# Patient Record
Sex: MALE | Race: WHITE | Employment: OTHER | URBAN - METROPOLITAN AREA
[De-identification: names, ages, dates, MRNs, and addresses within clinical notes are randomized per-mention and may not be internally consistent; named-entity substitution may affect disease eponyms.]

---

## 2020-10-05 ENCOUNTER — HOSPITAL ENCOUNTER (EMERGENCY)
Age: 40
Discharge: HOME | End: 2020-10-05
Attending: EMERGENCY MEDICINE

## 2020-10-05 VITALS
OXYGEN SATURATION: 100 % | SYSTOLIC BLOOD PRESSURE: 141 MMHG | RESPIRATION RATE: 18 BRPM | WEIGHT: 220 LBS | HEIGHT: 72 IN | TEMPERATURE: 98.2 F | DIASTOLIC BLOOD PRESSURE: 79 MMHG | HEART RATE: 82 BPM | BODY MASS INDEX: 29.8 KG/M2

## 2020-10-05 PROCEDURE — 99283 EMERGENCY DEPT VISIT LOW MDM: CPT

## 2020-10-05 PROCEDURE — 6370000000 HC RX 637 (ALT 250 FOR IP): Performed by: EMERGENCY MEDICINE

## 2020-10-05 RX ORDER — OXYCODONE HYDROCHLORIDE AND ACETAMINOPHEN 5; 325 MG/1; MG/1
1 TABLET ORAL EVERY 4 HOURS PRN
Qty: 10 TABLET | Refills: 0 | Status: SHIPPED | OUTPATIENT
Start: 2020-10-05 | End: 2020-10-08

## 2020-10-05 RX ORDER — NEOMYCIN SULFATE, POLYMYXIN B SULFATE, AND DEXAMETHASONE 3.5; 10000; 1 MG/G; [USP'U]/G; MG/G
OINTMENT OPHTHALMIC ONCE
Status: COMPLETED | OUTPATIENT
Start: 2020-10-05 | End: 2020-10-05

## 2020-10-05 RX ORDER — OXYCODONE HYDROCHLORIDE AND ACETAMINOPHEN 5; 325 MG/1; MG/1
2 TABLET ORAL ONCE
Status: COMPLETED | OUTPATIENT
Start: 2020-10-05 | End: 2020-10-05

## 2020-10-05 RX ORDER — ONDANSETRON 4 MG/1
4 TABLET, ORALLY DISINTEGRATING ORAL ONCE
Status: COMPLETED | OUTPATIENT
Start: 2020-10-05 | End: 2020-10-05

## 2020-10-05 RX ADMIN — ONDANSETRON 4 MG: 4 TABLET, ORALLY DISINTEGRATING ORAL at 01:25

## 2020-10-05 RX ADMIN — OXYCODONE HYDROCHLORIDE AND ACETAMINOPHEN 2 TABLET: 5; 325 TABLET ORAL at 01:25

## 2020-10-05 RX ADMIN — NEOMYCIN AND POLYMYXIN B SULFATES AND DEXAMETHASONE: 3.5; 10000; 1 OINTMENT OPHTHALMIC at 01:25

## 2020-10-05 ASSESSMENT — PAIN SCALES - GENERAL
PAINLEVEL_OUTOF10: 8
PAINLEVEL_OUTOF10: 8

## 2020-10-05 NOTE — ED PROVIDER NOTES
[facility] ED  eMERGENCY dEPARTMENT eNCOUnter      Pt Name: Bobby Cruz  MRN: [id number]  [place] 1980  Date of evaluation: 10/5/2020  Provider: Ilene Paz MD    CHIEF COMPLAINT       Chief Complaint   Patient presents with    Eye Pain         HISTORY OF PRESENT ILLNESS  (Location/Symptom, Timing/Onset, Context/Setting, Quality, Duration, Modifying Factors, Severity.)   Bobby Cruz is a 44 y.o. male who presents to the emergency department for evaluation of possible foreign body in his left eye. Patient states he was driving home with the windows open. He states he felt something go into his eye. It felt like it was under his upper eyelid. Through the course of the evening he is tried many ways to remove the foreign body. He has flushed the eye he has used a water sprayer in his eye he has used Q-tips. He has used multiple eyedrops. He continues to have foreign body sensation. He does not wear contacts. Immunization status is up-to-date. Nursing Notes were reviewed. ALLERGIES     Patient has no known allergies. CURRENT MEDICATIONS       Previous Medications    No medications on file       PAST MEDICAL HISTORY   History reviewed. No pertinent past medical history. SURGICAL HISTORY           Procedure Laterality Date    APPENDECTOMY      HAND SURGERY Right          FAMILY HISTORY     History reviewed. No pertinent family history. No family status information on file. SOCIAL HISTORY      reports that he has been smoking. He does not have any smokeless tobacco history on file. He reports that he does not drink alcohol or use drugs. REVIEW OF SYSTEMS    (2-9 systems for level 4, 10 or more for level 5)     Review of Systems   All other systems reviewed and are negative. Except as noted above the remainder of the review of systems was reviewed and negative.      PHYSICAL EXAM    (up to 7 for level 4, 8 or more for level 5)     Vitals: 10/05/20 0032   BP: (!) 141/79   Pulse: 82   Temp: 98.2 °F (36.8 °C)   TempSrc: Oral   SpO2: 100%   Weight: 220 lb (99.8 kg)   Height: 6' (1.829 m)       Physical exam reflects an uncomfortable male. He is afebrile with stable vital signs to include pulse ox of 100% on room air. He is not hypoxic. He is alert conversive and appropriate in behavior. Left eye shows scleral and conjunctival injection. Tetracaine applied with good effect. Corneal margins are distinct. Globes are soft and symmetric. Eyelids everted for exam no evidence of foreign body to the upper or lower eyelid. I examined no gross evidence of foreign body. Tetracaine applied. No evidence of corneal abrasion or foreign body. Juan Carlos test negative. No periorbital inflammation. No eyelid abnormality. Right eye is without symptoms. Remainder of exam to include chest abdomen extremities without evidence of or complaint of injury or illness      DIAGNOSTIC RESULTS     EKG: All EKG's are interpreted by the Emergency Department Physician who either signs or Co-signs this chart in the absence of a cardiologist.    RADIOLOGY:   Non-plain film images such as CT, Ultrasound and MRI are read by the radiologist. Plain radiographic images are visualized and preliminarily interpreted by the emergency physician with the below findings:  Interpretation per the Radiologist below, if available at the time of this note:    No orders to display         ED BEDSIDE ULTRASOUND:   Performed by ED Physician - none    LABS:  Labs Reviewed - No data to display    All other labs were within normal range or not returned as of this dictation. EMERGENCY DEPARTMENT COURSE and DIFFERENTIAL DIAGNOSIS/MDM:   Vitals:    Vitals:    10/05/20 0032   BP: (!) 141/79   Pulse: 82   Temp: 98.2 °F (36.8 °C)   TempSrc: Oral   SpO2: 100%   Weight: 220 lb (99.8 kg)   Height: 6' (1.829 m)     Patient's eye is examined. No evidence of discrete foreign body noted.   Patient does display scleritis. He does have foreign body sensation. I cannot exclude that initially had foreign body which through his efforts he succeeded in removing but now has the scleritis and inflammation remaining to cause his discomfort. He is treated for pain. He is placed on Maxitrol ophthalmic ointment. He will be discharged home. He is advised reevaluation with ophthalmology should his symptoms persist    CONSULTS:  None    PROCEDURES:  None    FINAL IMPRESSION      1. Scleritis of left eye    2. Sensation of foreign body in eye          DISPOSITION/PLAN   DISPOSITION Decision To Discharge 10/05/2020 01:03:39 AM      PATIENT REFERRED TO:   Julio Cesar Lundy  [address]  [phone]    Call today  If symptoms worsen - Ophthalmology re-evaluation      DISCHARGE MEDICATIONS:     New Prescriptions    NEOMYCIN-POLYMYXIN-DEXAMETH 0.1 % OINT    Apply 0.5 inches to eye every 4 hours    OXYCODONE-ACETAMINOPHEN (PERCOCET) 5-325 MG PER TABLET    Take 1 tablet by mouth every 4 hours as needed for Pain for up to 3 days. Controlled Substance Monitoring:    Acute and Chronic Pain Monitoring:   RX Monitoring 10/5/2020   Periodic Controlled Substance Monitoring No signs of potential drug abuse or diversion identified.          (Please note that portions of this note were completed with a voice recognition program.  Efforts were made to edit the dictations but occasionally words are mis-transcribed.)    Gino Lennox, MD  Attending Emergency Physician          Gino Lennox, MD  10/05/20 0110

## 2022-01-09 ENCOUNTER — APPOINTMENT (OUTPATIENT)
Dept: GENERAL RADIOLOGY | Age: 42
End: 2022-01-09

## 2022-01-09 ENCOUNTER — HOSPITAL ENCOUNTER (EMERGENCY)
Age: 42
Discharge: HOME | End: 2022-01-09
Attending: EMERGENCY MEDICINE

## 2022-01-09 VITALS
HEIGHT: 72 IN | TEMPERATURE: 98.8 F | SYSTOLIC BLOOD PRESSURE: 139 MMHG | DIASTOLIC BLOOD PRESSURE: 107 MMHG | RESPIRATION RATE: 16 BRPM | WEIGHT: 220 LBS | BODY MASS INDEX: 29.8 KG/M2 | OXYGEN SATURATION: 96 % | HEART RATE: 108 BPM

## 2022-01-09 DIAGNOSIS — S61.402A OPEN WOUND OF LEFT HAND, FOREIGN BODY PRESENCE UNSPECIFIED, UNSPECIFIED WOUND TYPE, INITIAL ENCOUNTER: ICD-10-CM

## 2022-01-09 DIAGNOSIS — S41.102A OPEN ARM WOUND, LEFT, INITIAL ENCOUNTER: ICD-10-CM

## 2022-01-09 DIAGNOSIS — F19.10 IV DRUG ABUSE (HCC): Primary | ICD-10-CM

## 2022-01-09 LAB
ABSOLUTE EOS #: 0.12 K/UL (ref 0–0.44)
ABSOLUTE IMMATURE GRANULOCYTE: 0.05 K/UL (ref 0–0.3)
ABSOLUTE LYMPH #: 1 K/UL (ref 1.1–3.7)
ABSOLUTE MONO #: 0.52 K/UL (ref 0.1–1.2)
ALBUMIN SERPL-MCNC: 4.2 G/DL (ref 3.5–5.2)
ALBUMIN/GLOBULIN RATIO: ABNORMAL (ref 1–2.5)
ALP BLD-CCNC: 100 U/L (ref 40–129)
ALT SERPL-CCNC: 16 U/L (ref 5–41)
ANION GAP SERPL CALCULATED.3IONS-SCNC: 16 MMOL/L (ref 9–17)
AST SERPL-CCNC: 23 U/L
BASOPHILS # BLD: 0 % (ref 0–2)
BASOPHILS ABSOLUTE: 0.05 K/UL (ref 0–0.2)
BILIRUB SERPL-MCNC: 0.52 MG/DL (ref 0.3–1.2)
BUN BLDV-MCNC: 14 MG/DL (ref 6–20)
BUN/CREAT BLD: 15 (ref 9–20)
C-REACTIVE PROTEIN: 36.7 MG/L (ref 0–5)
CALCIUM SERPL-MCNC: 9.6 MG/DL (ref 8.6–10.4)
CHLORIDE BLD-SCNC: 102 MMOL/L (ref 98–107)
CO2: 23 MMOL/L (ref 20–31)
CREAT SERPL-MCNC: 0.96 MG/DL (ref 0.7–1.2)
DIFFERENTIAL TYPE: ABNORMAL
EOSINOPHILS RELATIVE PERCENT: 1 % (ref 1–4)
GFR AFRICAN AMERICAN: >60 ML/MIN
GFR NON-AFRICAN AMERICAN: >60 ML/MIN
GFR SERPL CREATININE-BSD FRML MDRD: ABNORMAL ML/MIN/{1.73_M2}
GFR SERPL CREATININE-BSD FRML MDRD: ABNORMAL ML/MIN/{1.73_M2}
GLUCOSE BLD-MCNC: 142 MG/DL (ref 70–99)
HCT VFR BLD CALC: 41.9 % (ref 40.7–50.3)
HEMOGLOBIN: 12.5 G/DL (ref 13–17)
IMMATURE GRANULOCYTES: 0 %
INR BLD: 1
LACTIC ACID: 2.5 MMOL/L (ref 0.5–2.2)
LYMPHOCYTES # BLD: 8 % (ref 24–43)
MCH RBC QN AUTO: 22.5 PG (ref 25.2–33.5)
MCHC RBC AUTO-ENTMCNC: 29.8 G/DL (ref 28.4–34.8)
MCV RBC AUTO: 75.4 FL (ref 82.6–102.9)
MONOCYTES # BLD: 4 % (ref 3–12)
NRBC AUTOMATED: 0 PER 100 WBC
PARTIAL THROMBOPLASTIN TIME: 30.5 SEC (ref 23.9–33.8)
PDW BLD-RTO: 15.9 % (ref 11.8–14.4)
PLATELET # BLD: 467 K/UL (ref 138–453)
PLATELET ESTIMATE: ABNORMAL
PMV BLD AUTO: 9 FL (ref 8.1–13.5)
POTASSIUM SERPL-SCNC: 4 MMOL/L (ref 3.7–5.3)
PROCALCITONIN: 0.12 NG/ML
PROTHROMBIN TIME: 13 SEC (ref 11.5–14.2)
RBC # BLD: 5.56 M/UL (ref 4.21–5.77)
RBC # BLD: ABNORMAL 10*6/UL
SEDIMENTATION RATE, ERYTHROCYTE: 32 MM (ref 0–15)
SEG NEUTROPHILS: 87 % (ref 36–65)
SEGMENTED NEUTROPHILS ABSOLUTE COUNT: 10.51 K/UL (ref 1.5–8.1)
SODIUM BLD-SCNC: 141 MMOL/L (ref 135–144)
TOTAL PROTEIN: 8.7 G/DL (ref 6.4–8.3)
WBC # BLD: 12.3 K/UL (ref 3.5–11.3)
WBC # BLD: ABNORMAL 10*3/UL

## 2022-01-09 PROCEDURE — 86140 C-REACTIVE PROTEIN: CPT

## 2022-01-09 PROCEDURE — 96375 TX/PRO/DX INJ NEW DRUG ADDON: CPT

## 2022-01-09 PROCEDURE — 85025 COMPLETE CBC W/AUTO DIFF WBC: CPT

## 2022-01-09 PROCEDURE — 96376 TX/PRO/DX INJ SAME DRUG ADON: CPT

## 2022-01-09 PROCEDURE — 96365 THER/PROPH/DIAG IV INF INIT: CPT

## 2022-01-09 PROCEDURE — 84145 PROCALCITONIN (PCT): CPT

## 2022-01-09 PROCEDURE — 83605 ASSAY OF LACTIC ACID: CPT

## 2022-01-09 PROCEDURE — 85652 RBC SED RATE AUTOMATED: CPT

## 2022-01-09 PROCEDURE — 99282 EMERGENCY DEPT VISIT SF MDM: CPT

## 2022-01-09 PROCEDURE — 36415 COLL VENOUS BLD VENIPUNCTURE: CPT

## 2022-01-09 PROCEDURE — 87040 BLOOD CULTURE FOR BACTERIA: CPT

## 2022-01-09 PROCEDURE — 85730 THROMBOPLASTIN TIME PARTIAL: CPT

## 2022-01-09 PROCEDURE — 96372 THER/PROPH/DIAG INJ SC/IM: CPT

## 2022-01-09 PROCEDURE — 73110 X-RAY EXAM OF WRIST: CPT

## 2022-01-09 PROCEDURE — 2580000003 HC RX 258: Performed by: EMERGENCY MEDICINE

## 2022-01-09 PROCEDURE — 85610 PROTHROMBIN TIME: CPT

## 2022-01-09 PROCEDURE — 6360000002 HC RX W HCPCS: Performed by: EMERGENCY MEDICINE

## 2022-01-09 PROCEDURE — 73090 X-RAY EXAM OF FOREARM: CPT

## 2022-01-09 PROCEDURE — 80053 COMPREHEN METABOLIC PANEL: CPT

## 2022-01-09 PROCEDURE — 73130 X-RAY EXAM OF HAND: CPT

## 2022-01-09 PROCEDURE — 2500000003 HC RX 250 WO HCPCS: Performed by: EMERGENCY MEDICINE

## 2022-01-09 RX ORDER — 0.9 % SODIUM CHLORIDE 0.9 %
1000 INTRAVENOUS SOLUTION INTRAVENOUS ONCE
Status: COMPLETED | OUTPATIENT
Start: 2022-01-09 | End: 2022-01-09

## 2022-01-09 RX ORDER — HYDROMORPHONE HYDROCHLORIDE 1 MG/ML
1 INJECTION, SOLUTION INTRAMUSCULAR; INTRAVENOUS; SUBCUTANEOUS ONCE
Status: COMPLETED | OUTPATIENT
Start: 2022-01-09 | End: 2022-01-09

## 2022-01-09 RX ORDER — PROMETHAZINE HYDROCHLORIDE 25 MG/ML
25 INJECTION, SOLUTION INTRAMUSCULAR; INTRAVENOUS ONCE
Status: DISCONTINUED | OUTPATIENT
Start: 2022-01-09 | End: 2022-01-09 | Stop reason: HOSPADM

## 2022-01-09 RX ORDER — HYDROMORPHONE HYDROCHLORIDE 1 MG/ML
1 INJECTION, SOLUTION INTRAMUSCULAR; INTRAVENOUS; SUBCUTANEOUS ONCE
Status: DISCONTINUED | OUTPATIENT
Start: 2022-01-09 | End: 2022-01-09 | Stop reason: HOSPADM

## 2022-01-09 RX ORDER — CEFTRIAXONE 1 G/1
1000 INJECTION, POWDER, FOR SOLUTION INTRAMUSCULAR; INTRAVENOUS ONCE
Status: COMPLETED | OUTPATIENT
Start: 2022-01-09 | End: 2022-01-09

## 2022-01-09 RX ORDER — HYDROXYZINE HYDROCHLORIDE 25 MG/ML
25 INJECTION, SOLUTION INTRAMUSCULAR ONCE
Status: DISCONTINUED | OUTPATIENT
Start: 2022-01-09 | End: 2022-01-09 | Stop reason: HOSPADM

## 2022-01-09 RX ORDER — HYDROXYZINE HYDROCHLORIDE 25 MG/1
50 TABLET, FILM COATED ORAL ONCE
Status: DISCONTINUED | OUTPATIENT
Start: 2022-01-09 | End: 2022-01-09

## 2022-01-09 RX ORDER — MORPHINE SULFATE 4 MG/ML
4 INJECTION, SOLUTION INTRAMUSCULAR; INTRAVENOUS ONCE
Status: COMPLETED | OUTPATIENT
Start: 2022-01-09 | End: 2022-01-09

## 2022-01-09 RX ORDER — ONDANSETRON 2 MG/ML
4 INJECTION INTRAMUSCULAR; INTRAVENOUS ONCE
Status: COMPLETED | OUTPATIENT
Start: 2022-01-09 | End: 2022-01-09

## 2022-01-09 RX ADMIN — HYDROMORPHONE HYDROCHLORIDE 1 MG: 1 INJECTION, SOLUTION INTRAMUSCULAR; INTRAVENOUS; SUBCUTANEOUS at 10:05

## 2022-01-09 RX ADMIN — VANCOMYCIN HYDROCHLORIDE 2500 MG: 5 INJECTION, POWDER, LYOPHILIZED, FOR SOLUTION INTRAVENOUS at 10:07

## 2022-01-09 RX ADMIN — MORPHINE SULFATE 4 MG: 4 INJECTION, SOLUTION INTRAMUSCULAR; INTRAVENOUS at 09:23

## 2022-01-09 RX ADMIN — CEFTRIAXONE SODIUM 1000 MG: 1 INJECTION, POWDER, FOR SOLUTION INTRAMUSCULAR; INTRAVENOUS at 09:29

## 2022-01-09 RX ADMIN — ONDANSETRON 4 MG: 2 INJECTION INTRAMUSCULAR; INTRAVENOUS at 09:23

## 2022-01-09 RX ADMIN — FAMOTIDINE 20 MG: 10 INJECTION, SOLUTION INTRAVENOUS at 09:23

## 2022-01-09 RX ADMIN — SODIUM CHLORIDE 1000 ML: 9 INJECTION, SOLUTION INTRAVENOUS at 09:22

## 2022-01-09 RX ADMIN — MORPHINE SULFATE 4 MG: 4 INJECTION, SOLUTION INTRAMUSCULAR; INTRAVENOUS at 09:35

## 2022-01-09 ASSESSMENT — ENCOUNTER SYMPTOMS
ABDOMINAL PAIN: 0
NAUSEA: 1
COLOR CHANGE: 1
VOMITING: 1
DIARRHEA: 0

## 2022-01-09 ASSESSMENT — PAIN SCALES - GENERAL
PAINLEVEL_OUTOF10: 10

## 2022-01-09 NOTE — ED PROVIDER NOTES
[facility]  Emergency Department Encounter     Pt Name: Nora Du  MRN: [id number]  [place] 1980  Date of evaluation: 1/9/22  PCP:  No primary care provider on file. CHIEF COMPLAINT       Chief Complaint   Patient presents with    Wound Infection     L arm    Emesis       HISTORY OF PRESENT ILLNESS  (Location/Symptom, Timing/Onset, Context/Setting, Quality, Duration, Modifying Factors, Severity.)    Nora Du is a 39 y.o. male who presents with active IV drug abuse last used yesterday evening who presents emergency department with a couple months of wound to left hand and left forearm that has been progressively worsening. He states in the past 24 hours the pain has become unbearable. He states that he typically uses heroin but he gets whenever he can on the street and does inject. Last use was yesterday. He has not been running any fevers that he is aware of. However in the past 24 hours he has had multiple episodes of nausea with nonbilious nonbloody vomiting as well as body aches. Has never been evaluated for this as he is afraid of hospitals. He has no chronic medical additions that he is aware of. Not on any daily medications. Has no known medication allergies. PAST MEDICAL / SURGICAL / SOCIAL / FAMILY HISTORY    has no past medical history on file. has a past surgical history that includes Appendectomy and Hand surgery (Right).     Social History     Socioeconomic History    Marital status: Single     Spouse name: Not on file    Number of children: Not on file    Years of education: Not on file    Highest education level: Not on file   Occupational History    Not on file   Tobacco Use    Smoking status: Current Every Day Smoker    Smokeless tobacco: Never Used   Substance and Sexual Activity    Alcohol use: No    Drug use: Yes     Types: IV     Comment: Heroin    Sexual activity: Not on file   Other Topics Concern  Not on file   Social History Narrative    Not on file     Social Determinants of Health     Financial Resource Strain:     Difficulty of Paying Living Expenses: Not on file   Food Insecurity:     Worried About Running Out of Food in the Last Year: Not on file    Nancy of Food in the Last Year: Not on file   Transportation Needs:     Lack of Transportation (Medical): Not on file    Lack of Transportation (Non-Medical): Not on file   Physical Activity:     Days of Exercise per Week: Not on file    Minutes of Exercise per Session: Not on file   Stress:     Feeling of Stress : Not on file   Social Connections:     Frequency of Communication with Friends and Family: Not on file    Frequency of Social Gatherings with Friends and Family: Not on file    Attends Jewish Services: Not on file    Active Member of [address] [place] [address] [employer] or Organizations: Not on file    Attends Club or Organization Meetings: Not on file    Marital Status: Not on file   Intimate Partner Violence:     Fear of Current or Ex-Partner: Not on file    Emotionally Abused: Not on file    Physically Abused: Not on file    Sexually Abused: Not on file   Housing Stability:     Unable to Pay for Housing in the Last Year: Not on file    Number of [place] in the Last Year: Not on file    Unstable Housing in the Last Year: Not on file       History reviewed. No pertinent family history. Allergies:    Patient has no known allergies. Home Medications:  Prior to Admission medications    Medication Sig Start Date End Date Taking? Authorizing Provider   Neomycin-Polymyxin-Dexameth 0.1 % OINT Apply 0.5 inches to eye every 4 hours 10/5/20   Bill Plata MD       REVIEW OF SYSTEMS    (2-9 systems for level 4, 10 or more for level 5)    Review of Systems   Constitutional: Negative for chills, diaphoresis and fever. Gastrointestinal: Positive for nausea and vomiting. Negative for abdominal pain and diarrhea.    Musculoskeletal: Positive for myalgias. Skin: Positive for color change and wound. Allergic/Immunologic: Negative for immunocompromised state. Neurological: Negative for dizziness, weakness, light-headedness and numbness. PHYSICAL EXAM   (up to 7 for level 4, 8 or more for level 5)    VITALS:   Vitals:    01/09/22 0828   BP: (!) 139/107   Pulse: 108   Resp: 16   Temp: 98.8 °F (37.1 °C)   TempSrc: Oral   SpO2: 96%   Weight: 220 lb (99.8 kg)   Height: 6' (1.829 m)       Physical Exam  Vitals and nursing note reviewed. Constitutional:       General: He is not in acute distress. Appearance: He is well-developed. He is diaphoretic. HENT:      Head: Normocephalic and atraumatic. Eyes:      Conjunctiva/sclera: Conjunctivae normal.   Cardiovascular:      Rate and Rhythm: Normal rate and regular rhythm. Pulses: Normal pulses. Heart sounds: Normal heart sounds. Pulmonary:      Effort: Pulmonary effort is normal. No respiratory distress. Breath sounds: Normal breath sounds. No wheezing or rales. Abdominal:      General: There is no distension. Palpations: Abdomen is soft. Tenderness: There is no abdominal tenderness. Musculoskeletal:         General: Swelling and tenderness present. Normal range of motion. Cervical back: Normal range of motion. Skin:     General: Skin is warm. Capillary Refill: Capillary refill takes 2 to 3 seconds. Findings: Erythema, lesion and wound present. No bruising or ecchymosis. Comments: 2 large ulcerations. The first ulceration is to the dorsal aspect of the left hand that is down to the muscle layer with surrounding induration and erythema. The second area of ulceration is quite extensive extending most of the dorsal aspect of the left forearm down to the muscle belly layer with areas of necrosis surrounding erythema and induration. There is no active bleeding or active drainage.   There is also a area on the right hand on the dorsal aspect of ulceration, however this is scabbed over. There is some swelling, however no erythema. No active drainage or bleeding from the site. Neurological:      General: No focal deficit present. Mental Status: He is alert.    Psychiatric:         Behavior: Behavior normal.         DIFFERENTIAL  DIAGNOSIS   PLAN (LABS / IMAGING / EKG):  Orders Placed This Encounter   Procedures    Culture, Blood 1    XR HAND LEFT (MIN 3 VIEWS)    XR WRIST LEFT (MIN 3 VIEWS)    XR RADIUS ULNA LEFT (2 VIEWS)    CBC with DIFF    Comprehensive Metabolic Panel w/ Reflex to MG    APTT    PROTIME-INR    Lactic Acid    Procalcitonin    Sedimentation Rate    C-Reactive Protein    Lactic Acid    Pharmacy to Dose: Vancomycin    Insert peripheral IV       MEDICATIONS ORDERED:  Orders Placed This Encounter   Medications    cefTRIAXone (ROCEPHIN) injection 1,000 mg     Order Specific Question:   Antimicrobial Indications     Answer:   Skin and Soft Tissue Infection    0.9 % sodium chloride bolus    morphine sulfate (PF) injection 4 mg    ondansetron (ZOFRAN) injection 4 mg    famotidine (PEPCID) injection 20 mg    vancomycin (VANCOCIN) 2,500 mg in dextrose 5 % 500 mL IVPB     Order Specific Question:   Antimicrobial Indications     Answer:   Skin and Soft Tissue Infection    morphine sulfate (PF) injection 4 mg    HYDROmorphone HCl PF (DILAUDID) injection 1 mg    promethazine (PHENERGAN) injection 25 mg    DISCONTD: hydrOXYzine (ATARAX) tablet 50 mg    hydrOXYzine (VISTARIL) injection 25 mg    HYDROmorphone HCl PF (DILAUDID) injection 1 mg     DIAGNOSTIC RESULTS / EMERGENCYDEPARTMENT COURSE / MDM   LABS:  Labs Reviewed   CBC WITH AUTO DIFFERENTIAL - Abnormal; Notable for the following components:       Result Value    WBC 12.3 (*)     Hemoglobin 12.5 (*)     MCV 75.4 (*)     MCH 22.5 (*)     RDW 15.9 (*)     Platelets 705 (*)     Seg Neutrophils 87 (*)     Lymphocytes 8 (*)     Segs Absolute 10.51 (*)     Absolute Lymph potentially in the wrist as well. Diffuse soft tissue swelling with soft tissue defects. No acute osseous abnormality. XR HAND LEFT (MIN 3 VIEWS)    Result Date: 1/9/2022  EXAMINATION: THREE XRAY VIEWS OF THE LEFT HAND 1/9/2022 9:09 am COMPARISON: Left hand radiographs performed 11/04/2007. HISTORY: ORDERING SYSTEM PROVIDED HISTORY: IVDA wound swelling TECHNOLOGIST PROVIDED HISTORY: IVDA wound swelling Reason for Exam: wound FINDINGS: There is no acute osseous abnormality. The joint spaces are maintained. There is diffuse soft tissue swelling. There is soft tissue defect on the dorsal aspect of the hand and potentially the distal forearm. Diffuse soft tissue swelling with soft tissue defects. No acute osseous abnormality.        EMERGENCY DEPARTMENT COURSE:  ED Course as of 01/09/22 1059   Sun Jan 09, 2022   0957 Sed Rate(!): 32 [AO]   0957 CBC with DIFF(!):    WBC 12.3(!)   RBC 5.56   Hemoglobin Quant 12.5(!)   Hematocrit 41.9   MCV 75.4(!)   MCH 22.5(!)   MCHC 29.8   RDW 15.9(!)   Platelet Count 751(!)   MPV 9.0   NRBC Automated 0.0   Differential Type NOT REPORTED   Seg Neutrophils 87(!)   Lymphocytes 8(!)   Monocytes 4   Eosinophils % 1   Basophils 0   Immature Granulocytes 0   Segs Absolute 10.51(!)   Absolute Lymph # 1.00(!)   Absolute Mono # 0.52   Absolute Eos # 0.12   Basophils Absolute 0.05   Absolute Immature Granulocyte 0.05   WBC Morphology NOT REPORTED   RBC Morphology ANISOCYTOSIS PRESENT   Platelet Estimate NOT REPORTED [AO]   0957 INR: 1.0 [AO]   0957 PTT: 30.5 [AO]   0957 Lactic Acid(!): 2.5 [AO]   1012 Comprehensive Metabolic Panel w/ Reflex to MG(!):    Glucose 142(!)   BUN 14   Creatinine 0.96   Bun/Cre Ratio 15   CALCIUM, SERUM, 736599 9.6   Sodium 141   Potassium 4.0   Chloride 102   CO2 23   Anion Gap 16   Alk Phos 100   ALT 16   AST 23   Bilirubin 0.52   Total Protein 8.7(!)   Albumin 4.2   Albumin/Globulin Ratio NOT REPORTED   GFR Non- >60   GFR  >60   GFR Comment        GFR Staging NOT REPORTED [AO]   1012 XR WRIST LEFT (MIN 3 VIEWS) [AO]   1012 XR HAND LEFT (MIN 3 VIEWS) [AO]   1020 XR RADIUS ULNA LEFT (2 VIEWS) [AO]   9129 Notified by nursing that patient is no longer in room. Checked all the bathrooms, they are empty. Patient's mask and his gown around the bed. Will notify security that he potentially eloped with IV in place. [AO]      ED Course User Index  [AO] Mich Lopez 1721, DO       MDM  Number of Diagnoses or Management Options  IV drug abuse ([facility]  Open arm wound, left, initial encounter  Open wound of left hand, foreign body presence unspecified, unspecified wound type, initial encounter  Diagnosis management comments: Patient is a 66-year-old male who is a admitted IV drug abuser presents emergency department with pain in wound to the left arm as well as nausea and vomiting for the past 24 hours per initial evaluation he is obviously uncomfortable. Believes he is withdrawing. There are 2 open wounds on the muscle on both the left hand and left forearm as well as a crusted over lesion to the right hand. There is surrounding induration. The left forearm wound also has areas of necrosis. Concern for bacteremia, osteomyelitis, myositis. Sepsis labs ordered. Patient's pain was addressed and broad-spectrum antibiotics were ordered. He was going through multiple rounds of narcotic pain medication. He is also addressing his nausea and trying to help with his anxiety. His x-rays did show soft tissue swelling and defect, however there is no mention of concern for potential osteo-. However there did appear to be a potentially retained needle in his forearm. However patient ultimately eloped with IV in place.        Amount and/or Complexity of Data Reviewed  Clinical lab tests: ordered and reviewed  Tests in the radiology section of CPT®: ordered and reviewed  Review and summarize past medical records: yes  Independent visualization of images, tracings, or specimens: yes      PROCEDURES:  Procedures     CONSULTS:  PHARMACY TO DOSE VANCOMYCIN    CRITICAL CARE:  NONE    FINAL IMPRESSION     1. IV drug abuse ([facility] [address]    2. Open wound of left hand, foreign body presence unspecified, unspecified wound type, initial encounter    3. Open arm wound, left, initial encounter        DISPOSITION / Danni 61 M.  Sumanth Chavez DO  Emergency Medicine Physician    (Please note that portions of this note were completed with a voice recognition program.  Efforts were made to edit the dictations but occasionally words are mis-transcribed.)        Mich Lopez [id number]  01/09/22 1104

## 2022-01-14 LAB
CULTURE: NORMAL
Lab: NORMAL
SPECIMEN DESCRIPTION: NORMAL

## 2023-04-30 ENCOUNTER — APPOINTMENT (OUTPATIENT)
Dept: GENERAL RADIOLOGY | Age: 43
End: 2023-04-30
Payer: COMMERCIAL

## 2023-04-30 ENCOUNTER — APPOINTMENT (OUTPATIENT)
Dept: CT IMAGING | Age: 43
End: 2023-04-30
Payer: COMMERCIAL

## 2023-04-30 ENCOUNTER — HOSPITAL ENCOUNTER (EMERGENCY)
Age: 43
Discharge: HOME | End: 2023-04-30
Attending: EMERGENCY MEDICINE
Payer: COMMERCIAL

## 2023-04-30 VITALS
DIASTOLIC BLOOD PRESSURE: 88 MMHG | RESPIRATION RATE: 18 BRPM | SYSTOLIC BLOOD PRESSURE: 136 MMHG | OXYGEN SATURATION: 99 % | HEIGHT: 72 IN | BODY MASS INDEX: 24.38 KG/M2 | HEART RATE: 80 BPM | WEIGHT: 180 LBS | TEMPERATURE: 98.4 F

## 2023-04-30 DIAGNOSIS — F41.1 ANXIETY STATE: Primary | ICD-10-CM

## 2023-04-30 LAB
ALBUMIN SERPL-MCNC: 3.7 G/DL (ref 3.5–5.2)
ALBUMIN/GLOBULIN RATIO: 1 (ref 1–2.5)
ALP SERPL-CCNC: 99 U/L (ref 40–129)
ALT SERPL-CCNC: 23 U/L (ref 5–41)
ANION GAP SERPL CALCULATED.3IONS-SCNC: 15 MMOL/L (ref 9–17)
AST SERPL-CCNC: 45 U/L
BILIRUB SERPL-MCNC: 0.4 MG/DL (ref 0.3–1.2)
BUN SERPL-MCNC: 13 MG/DL (ref 6–20)
CALCIUM SERPL-MCNC: 9.4 MG/DL (ref 8.6–10.4)
CHLORIDE SERPL-SCNC: 101 MMOL/L (ref 98–107)
CO2 SERPL-SCNC: 21 MMOL/L (ref 20–31)
CREAT SERPL-MCNC: 0.88 MG/DL (ref 0.7–1.2)
D DIMER BLD IA.RAPID-MCNC: 1.03 UG/ML FEU (ref 0–0.57)
GFR SERPL CREATININE-BSD FRML MDRD: >60 ML/MIN/1.73M2
GLUCOSE SERPL-MCNC: 102 MG/DL (ref 70–99)
HCT VFR BLD AUTO: 33 % (ref 40.7–50.3)
HGB BLD-MCNC: 9.9 G/DL (ref 13–17)
MAGNESIUM SERPL-MCNC: 1.9 MG/DL (ref 1.6–2.6)
MCH RBC QN AUTO: 20 PG (ref 25.2–33.5)
MCHC RBC AUTO-ENTMCNC: 30 G/DL (ref 28.4–34.8)
MCV RBC AUTO: 66.5 FL (ref 82.6–102.9)
NRBC AUTOMATED: 0 PER 100 WBC
PDW BLD-RTO: 26.7 % (ref 11.8–14.4)
PLATELET # BLD AUTO: 367 K/UL (ref 138–453)
PMV BLD AUTO: 9.4 FL (ref 8.1–13.5)
POTASSIUM SERPL-SCNC: 3.9 MMOL/L (ref 3.7–5.3)
PROT SERPL-MCNC: 7.4 G/DL (ref 6.4–8.3)
RBC # BLD: 4.96 M/UL (ref 4.21–5.77)
SODIUM SERPL-SCNC: 137 MMOL/L (ref 135–144)
TROPONIN I SERPL DL<=0.01 NG/ML-MCNC: <6 NG/L (ref 0–22)
WBC # BLD AUTO: 8.6 K/UL (ref 3.5–11.3)

## 2023-04-30 PROCEDURE — 93005 ELECTROCARDIOGRAM TRACING: CPT | Performed by: STUDENT IN AN ORGANIZED HEALTH CARE EDUCATION/TRAINING PROGRAM

## 2023-04-30 PROCEDURE — 71260 CT THORAX DX C+: CPT

## 2023-04-30 PROCEDURE — 6360000002 HC RX W HCPCS: Performed by: STUDENT IN AN ORGANIZED HEALTH CARE EDUCATION/TRAINING PROGRAM

## 2023-04-30 PROCEDURE — 6360000004 HC RX CONTRAST MEDICATION: Performed by: STUDENT IN AN ORGANIZED HEALTH CARE EDUCATION/TRAINING PROGRAM

## 2023-04-30 PROCEDURE — 85027 COMPLETE CBC AUTOMATED: CPT

## 2023-04-30 PROCEDURE — 6370000000 HC RX 637 (ALT 250 FOR IP): Performed by: STUDENT IN AN ORGANIZED HEALTH CARE EDUCATION/TRAINING PROGRAM

## 2023-04-30 PROCEDURE — 71045 X-RAY EXAM CHEST 1 VIEW: CPT

## 2023-04-30 PROCEDURE — 96372 THER/PROPH/DIAG INJ SC/IM: CPT

## 2023-04-30 PROCEDURE — 83735 ASSAY OF MAGNESIUM: CPT

## 2023-04-30 PROCEDURE — 84484 ASSAY OF TROPONIN QUANT: CPT

## 2023-04-30 PROCEDURE — 85379 FIBRIN DEGRADATION QUANT: CPT

## 2023-04-30 PROCEDURE — 80053 COMPREHEN METABOLIC PANEL: CPT

## 2023-04-30 PROCEDURE — 99285 EMERGENCY DEPT VISIT HI MDM: CPT

## 2023-04-30 RX ORDER — HYDROXYZINE HYDROCHLORIDE 50 MG/ML
50 INJECTION, SOLUTION INTRAMUSCULAR ONCE
Status: COMPLETED | OUTPATIENT
Start: 2023-04-30 | End: 2023-04-30

## 2023-04-30 RX ORDER — CLONIDINE HYDROCHLORIDE 0.1 MG/1
0.1 TABLET ORAL ONCE
Status: COMPLETED | OUTPATIENT
Start: 2023-04-30 | End: 2023-04-30

## 2023-04-30 RX ORDER — ONDANSETRON 4 MG/1
4 TABLET, ORALLY DISINTEGRATING ORAL ONCE
Status: COMPLETED | OUTPATIENT
Start: 2023-04-30 | End: 2023-04-30

## 2023-04-30 RX ADMIN — IOPAMIDOL 75 ML: 755 INJECTION, SOLUTION INTRAVENOUS at 02:44

## 2023-04-30 RX ADMIN — CLONIDINE HYDROCHLORIDE 0.1 MG: 0.1 TABLET ORAL at 01:06

## 2023-04-30 RX ADMIN — ONDANSETRON 4 MG: 4 TABLET, ORALLY DISINTEGRATING ORAL at 00:43

## 2023-04-30 RX ADMIN — HYDROXYZINE HYDROCHLORIDE 50 MG: 50 INJECTION, SOLUTION INTRAMUSCULAR at 02:37

## 2023-04-30 ASSESSMENT — PAIN SCALES - GENERAL: PAINLEVEL_OUTOF10: 10

## 2023-04-30 ASSESSMENT — PAIN DESCRIPTION - LOCATION: LOCATION: CHEST;ARM

## 2023-04-30 ASSESSMENT — PAIN DESCRIPTION - ORIENTATION: ORIENTATION: LEFT;MID

## 2023-04-30 ASSESSMENT — ENCOUNTER SYMPTOMS
ABDOMINAL PAIN: 0
BACK PAIN: 0
CONSTIPATION: 0
ABDOMINAL DISTENTION: 0
SHORTNESS OF BREATH: 0

## 2023-04-30 ASSESSMENT — PAIN - FUNCTIONAL ASSESSMENT: PAIN_FUNCTIONAL_ASSESSMENT: 0-10

## 2023-04-30 ASSESSMENT — HEART SCORE
ECG: 1
ECG: 0

## 2023-05-01 LAB
EKG ATRIAL RATE: 59 BPM
EKG P AXIS: 54 DEGREES
EKG P-R INTERVAL: 148 MS
EKG Q-T INTERVAL: 438 MS
EKG QRS DURATION: 88 MS
EKG QTC CALCULATION (BAZETT): 433 MS
EKG R AXIS: 64 DEGREES
EKG T AXIS: 29 DEGREES
EKG VENTRICULAR RATE: 59 BPM

## 2024-11-01 ENCOUNTER — HOSPITAL ENCOUNTER (INPATIENT)
Age: 44
LOS: 1 days | Discharge: LEFT BEFORE DISCHARGE | DRG: 349 | End: 2024-11-02
Attending: EMERGENCY MEDICINE | Admitting: INTERNAL MEDICINE
Payer: COMMERCIAL

## 2024-11-01 ENCOUNTER — APPOINTMENT (OUTPATIENT)
Dept: GENERAL RADIOLOGY | Age: 44
DRG: 349 | End: 2024-11-01
Payer: COMMERCIAL

## 2024-11-01 DIAGNOSIS — D50.9 MICROCYTIC ANEMIA: ICD-10-CM

## 2024-11-01 DIAGNOSIS — M86.222 SUBACUTE OSTEOMYELITIS OF LEFT HUMERUS: ICD-10-CM

## 2024-11-01 DIAGNOSIS — F11.10 HEROIN ABUSE (HCC): ICD-10-CM

## 2024-11-01 DIAGNOSIS — F19.10 POLYSUBSTANCE ABUSE (HCC): ICD-10-CM

## 2024-11-01 DIAGNOSIS — D64.9 ANEMIA, UNSPECIFIED TYPE: ICD-10-CM

## 2024-11-01 DIAGNOSIS — T87.40 AMPUTATION STUMP INFECTION (HCC): Primary | ICD-10-CM

## 2024-11-01 PROCEDURE — 86140 C-REACTIVE PROTEIN: CPT

## 2024-11-01 PROCEDURE — 2580000003 HC RX 258

## 2024-11-01 PROCEDURE — 96375 TX/PRO/DX INJ NEW DRUG ADDON: CPT

## 2024-11-01 PROCEDURE — 87205 SMEAR GRAM STAIN: CPT

## 2024-11-01 PROCEDURE — 96365 THER/PROPH/DIAG IV INF INIT: CPT

## 2024-11-01 PROCEDURE — 99285 EMERGENCY DEPT VISIT HI MDM: CPT

## 2024-11-01 PROCEDURE — 6360000002 HC RX W HCPCS

## 2024-11-01 PROCEDURE — 85055 RETICULATED PLATELET ASSAY: CPT

## 2024-11-01 PROCEDURE — 71045 X-RAY EXAM CHEST 1 VIEW: CPT

## 2024-11-01 PROCEDURE — 73060 X-RAY EXAM OF HUMERUS: CPT

## 2024-11-01 PROCEDURE — 83930 ASSAY OF BLOOD OSMOLALITY: CPT

## 2024-11-01 PROCEDURE — 83605 ASSAY OF LACTIC ACID: CPT

## 2024-11-01 PROCEDURE — 84484 ASSAY OF TROPONIN QUANT: CPT

## 2024-11-01 PROCEDURE — 85610 PROTHROMBIN TIME: CPT

## 2024-11-01 PROCEDURE — 84145 PROCALCITONIN (PCT): CPT

## 2024-11-01 PROCEDURE — 87186 SC STD MICRODIL/AGAR DIL: CPT

## 2024-11-01 PROCEDURE — 73030 X-RAY EXAM OF SHOULDER: CPT

## 2024-11-01 PROCEDURE — 87077 CULTURE AEROBIC IDENTIFY: CPT

## 2024-11-01 PROCEDURE — 85384 FIBRINOGEN ACTIVITY: CPT

## 2024-11-01 PROCEDURE — 6370000000 HC RX 637 (ALT 250 FOR IP)

## 2024-11-01 PROCEDURE — 85025 COMPLETE CBC W/AUTO DIFF WBC: CPT

## 2024-11-01 PROCEDURE — 80053 COMPREHEN METABOLIC PANEL: CPT

## 2024-11-01 PROCEDURE — 87040 BLOOD CULTURE FOR BACTERIA: CPT

## 2024-11-01 RX ORDER — ACETAMINOPHEN 500 MG
1000 TABLET ORAL ONCE
Status: COMPLETED | OUTPATIENT
Start: 2024-11-01 | End: 2024-11-01

## 2024-11-01 RX ORDER — 0.9 % SODIUM CHLORIDE 0.9 %
1000 INTRAVENOUS SOLUTION INTRAVENOUS ONCE
Status: COMPLETED | OUTPATIENT
Start: 2024-11-01 | End: 2024-11-02

## 2024-11-01 RX ADMIN — SODIUM CHLORIDE 1000 ML: 9 INJECTION, SOLUTION INTRAVENOUS at 23:45

## 2024-11-01 RX ADMIN — PIPERACILLIN AND TAZOBACTAM 3375 MG: 3; .375 INJECTION, POWDER, FOR SOLUTION INTRAVENOUS at 23:55

## 2024-11-01 RX ADMIN — ACETAMINOPHEN 1000 MG: 500 TABLET ORAL at 23:52

## 2024-11-01 RX ADMIN — HYDROMORPHONE HYDROCHLORIDE 0.5 MG: 1 INJECTION, SOLUTION INTRAMUSCULAR; INTRAVENOUS; SUBCUTANEOUS at 23:43

## 2024-11-01 ASSESSMENT — PAIN SCALES - GENERAL
PAINLEVEL_OUTOF10: 9
PAINLEVEL_OUTOF10: 9

## 2024-11-01 ASSESSMENT — PAIN DESCRIPTION - ORIENTATION: ORIENTATION: LEFT

## 2024-11-01 ASSESSMENT — PAIN - FUNCTIONAL ASSESSMENT: PAIN_FUNCTIONAL_ASSESSMENT: 0-10

## 2024-11-01 ASSESSMENT — PAIN DESCRIPTION - LOCATION: LOCATION: ARM

## 2024-11-02 ENCOUNTER — APPOINTMENT (OUTPATIENT)
Dept: CT IMAGING | Age: 44
DRG: 349 | End: 2024-11-02
Payer: COMMERCIAL

## 2024-11-02 VITALS
SYSTOLIC BLOOD PRESSURE: 167 MMHG | OXYGEN SATURATION: 95 % | HEART RATE: 65 BPM | TEMPERATURE: 98 F | HEIGHT: 72 IN | RESPIRATION RATE: 16 BRPM | BODY MASS INDEX: 22.35 KG/M2 | WEIGHT: 165 LBS | DIASTOLIC BLOOD PRESSURE: 63 MMHG

## 2024-11-02 PROBLEM — M86.9: Status: ACTIVE | Noted: 2024-11-02

## 2024-11-02 PROBLEM — T87.40 AMPUTATION STUMP INFECTION (HCC): Status: ACTIVE | Noted: 2024-11-02

## 2024-11-02 LAB
ALBUMIN SERPL-MCNC: 3.3 G/DL (ref 3.5–5.2)
ALBUMIN/GLOB SERPL: 1 {RATIO} (ref 1–2.5)
ALP SERPL-CCNC: 96 U/L (ref 40–129)
ALT SERPL-CCNC: 9 U/L (ref 10–50)
AMPHET UR QL SCN: NEGATIVE
ANION GAP SERPL CALCULATED.3IONS-SCNC: 11 MMOL/L (ref 9–16)
AST SERPL-CCNC: 27 U/L (ref 10–50)
BACTERIA URNS QL MICRO: NORMAL
BARBITURATES UR QL SCN: NEGATIVE
BASOPHILS # BLD: 0 K/UL (ref 0–0.2)
BASOPHILS NFR BLD: 0 % (ref 0–2)
BENZODIAZ UR QL: NEGATIVE
BILIRUB SERPL-MCNC: 0.2 MG/DL (ref 0–1.2)
BILIRUB UR QL STRIP: NEGATIVE
BUN SERPL-MCNC: 16 MG/DL (ref 6–20)
CALCIUM SERPL-MCNC: 8.2 MG/DL (ref 8.6–10.4)
CANNABINOIDS UR QL SCN: NEGATIVE
CASTS #/AREA URNS LPF: NORMAL /LPF (ref 0–8)
CHLORIDE SERPL-SCNC: 106 MMOL/L (ref 98–107)
CLARITY UR: CLEAR
CO2 SERPL-SCNC: 24 MMOL/L (ref 20–31)
COCAINE UR QL SCN: NEGATIVE
COLOR UR: YELLOW
CREAT SERPL-MCNC: 1 MG/DL (ref 0.7–1.2)
CRP SERPL HS-MCNC: 51.8 MG/L (ref 0–5)
EKG ATRIAL RATE: 84 BPM
EKG P AXIS: 71 DEGREES
EKG P-R INTERVAL: 158 MS
EKG Q-T INTERVAL: 372 MS
EKG QRS DURATION: 92 MS
EKG QTC CALCULATION (BAZETT): 439 MS
EKG R AXIS: 60 DEGREES
EKG T AXIS: 63 DEGREES
EKG VENTRICULAR RATE: 84 BPM
EOSINOPHIL # BLD: 0.06 K/UL (ref 0–0.44)
EOSINOPHILS RELATIVE PERCENT: 1 % (ref 1–4)
EPI CELLS #/AREA URNS HPF: NORMAL /HPF (ref 0–5)
ERYTHROCYTE [DISTWIDTH] IN BLOOD BY AUTOMATED COUNT: 21 % (ref 11.8–14.4)
FENTANYL UR QL: POSITIVE
FIBRINOGEN PPP-MCNC: 364 MG/DL (ref 203–521)
GFR, ESTIMATED: >90 ML/MIN/1.73M2
GLUCOSE BLD-MCNC: 121 MG/DL (ref 75–110)
GLUCOSE SERPL-MCNC: 69 MG/DL (ref 74–99)
GLUCOSE UR STRIP-MCNC: NEGATIVE MG/DL
HCT VFR BLD AUTO: 25.7 % (ref 40.7–50.3)
HGB BLD-MCNC: 6.9 G/DL (ref 13–17)
HGB UR QL STRIP.AUTO: NEGATIVE
IMM GRANULOCYTES # BLD AUTO: 0.06 K/UL (ref 0–0.3)
IMM GRANULOCYTES NFR BLD: 1 %
INR PPP: 1.1
KETONES UR STRIP-MCNC: NEGATIVE MG/DL
LACTIC ACID, SEPSIS WHOLE BLOOD: 1.3 MMOL/L (ref 0.5–1.9)
LACTIC ACID, SEPSIS WHOLE BLOOD: 2.4 MMOL/L (ref 0.5–1.9)
LEUKOCYTE ESTERASE UR QL STRIP: NEGATIVE
LYMPHOCYTES NFR BLD: 0.76 K/UL (ref 1.1–3.7)
LYMPHOCYTES RELATIVE PERCENT: 12 % (ref 24–43)
MCH RBC QN AUTO: 15.9 PG (ref 25.2–33.5)
MCHC RBC AUTO-ENTMCNC: 26.8 G/DL (ref 28.4–34.8)
MCV RBC AUTO: 59.2 FL (ref 82.6–102.9)
METHADONE UR QL: NEGATIVE
MICROORGANISM/AGENT SPEC: NORMAL
MONOCYTES NFR BLD: 0.63 K/UL (ref 0.1–1.2)
MONOCYTES NFR BLD: 10 % (ref 3–12)
MORPHOLOGY: ABNORMAL
NEUTROPHILS NFR BLD: 76 % (ref 36–65)
NEUTS SEG NFR BLD: 4.79 K/UL (ref 1.5–8.1)
NITRITE UR QL STRIP: NEGATIVE
NRBC BLD-RTO: 0 PER 100 WBC
OPIATES UR QL SCN: POSITIVE
OSMOLALITY SERPL: 302 MOSM/KG (ref 275–295)
OXYCODONE UR QL SCN: NEGATIVE
PCP UR QL SCN: NEGATIVE
PH UR STRIP: 7 [PH] (ref 5–8)
PLATELET # BLD AUTO: ABNORMAL K/UL (ref 138–453)
PLATELET, FLUORESCENCE: ABNORMAL K/UL (ref 138–453)
POTASSIUM SERPL-SCNC: 3.7 MMOL/L (ref 3.7–5.3)
PROCALCITONIN SERPL-MCNC: 0.16 NG/ML (ref 0–0.09)
PROT SERPL-MCNC: 6.7 G/DL (ref 6.6–8.7)
PROT UR STRIP-MCNC: ABNORMAL MG/DL
PROTHROMBIN TIME: 14.2 SEC (ref 11.7–14.9)
RBC # BLD AUTO: 4.34 M/UL (ref 4.21–5.77)
RBC #/AREA URNS HPF: NORMAL /HPF (ref 0–4)
SERVICE CMNT-IMP: NORMAL
SODIUM SERPL-SCNC: 141 MMOL/L (ref 136–145)
SP GR UR STRIP: 1.02 (ref 1–1.03)
SPECIMEN DESCRIPTION: NORMAL
TEST INFORMATION: ABNORMAL
TROPONIN I SERPL HS-MCNC: 6 NG/L (ref 0–22)
UROBILINOGEN UR STRIP-ACNC: NORMAL EU/DL (ref 0–1)
WBC #/AREA URNS HPF: NORMAL /HPF (ref 0–5)
WBC OTHER # BLD: 6.3 K/UL (ref 3.5–11.3)

## 2024-11-02 PROCEDURE — 96375 TX/PRO/DX INJ NEW DRUG ADDON: CPT

## 2024-11-02 PROCEDURE — 87186 SC STD MICRODIL/AGAR DIL: CPT

## 2024-11-02 PROCEDURE — 87077 CULTURE AEROBIC IDENTIFY: CPT

## 2024-11-02 PROCEDURE — 86901 BLOOD TYPING SEROLOGIC RH(D): CPT

## 2024-11-02 PROCEDURE — 1200000000 HC SEMI PRIVATE

## 2024-11-02 PROCEDURE — 93010 ELECTROCARDIOGRAM REPORT: CPT | Performed by: INTERNAL MEDICINE

## 2024-11-02 PROCEDURE — 87205 SMEAR GRAM STAIN: CPT

## 2024-11-02 PROCEDURE — 86403 PARTICLE AGGLUT ANTBDY SCRN: CPT

## 2024-11-02 PROCEDURE — 6370000000 HC RX 637 (ALT 250 FOR IP): Performed by: NURSE PRACTITIONER

## 2024-11-02 PROCEDURE — 86920 COMPATIBILITY TEST SPIN: CPT

## 2024-11-02 PROCEDURE — 83605 ASSAY OF LACTIC ACID: CPT

## 2024-11-02 PROCEDURE — 2580000003 HC RX 258: Performed by: NURSE PRACTITIONER

## 2024-11-02 PROCEDURE — 87147 CULTURE TYPE IMMUNOLOGIC: CPT

## 2024-11-02 PROCEDURE — 6370000000 HC RX 637 (ALT 250 FOR IP): Performed by: STUDENT IN AN ORGANIZED HEALTH CARE EDUCATION/TRAINING PROGRAM

## 2024-11-02 PROCEDURE — 87070 CULTURE OTHR SPECIMN AEROBIC: CPT

## 2024-11-02 PROCEDURE — 86850 RBC ANTIBODY SCREEN: CPT

## 2024-11-02 PROCEDURE — 6360000004 HC RX CONTRAST MEDICATION

## 2024-11-02 PROCEDURE — 2580000003 HC RX 258

## 2024-11-02 PROCEDURE — 80307 DRUG TEST PRSMV CHEM ANLYZR: CPT

## 2024-11-02 PROCEDURE — 82947 ASSAY GLUCOSE BLOOD QUANT: CPT

## 2024-11-02 PROCEDURE — 81001 URINALYSIS AUTO W/SCOPE: CPT

## 2024-11-02 PROCEDURE — 6360000002 HC RX W HCPCS: Performed by: NURSE PRACTITIONER

## 2024-11-02 PROCEDURE — 99223 1ST HOSP IP/OBS HIGH 75: CPT | Performed by: STUDENT IN AN ORGANIZED HEALTH CARE EDUCATION/TRAINING PROGRAM

## 2024-11-02 PROCEDURE — P9016 RBC LEUKOCYTES REDUCED: HCPCS

## 2024-11-02 PROCEDURE — 86900 BLOOD TYPING SEROLOGIC ABO: CPT

## 2024-11-02 PROCEDURE — 96374 THER/PROPH/DIAG INJ IV PUSH: CPT

## 2024-11-02 PROCEDURE — 6360000002 HC RX W HCPCS

## 2024-11-02 PROCEDURE — 73201 CT UPPER EXTREMITY W/DYE: CPT

## 2024-11-02 PROCEDURE — 93005 ELECTROCARDIOGRAM TRACING: CPT

## 2024-11-02 PROCEDURE — 96361 HYDRATE IV INFUSION ADD-ON: CPT

## 2024-11-02 RX ORDER — SODIUM CHLORIDE 9 MG/ML
INJECTION, SOLUTION INTRAVENOUS CONTINUOUS
Status: DISCONTINUED | OUTPATIENT
Start: 2024-11-02 | End: 2024-11-02

## 2024-11-02 RX ORDER — SODIUM CHLORIDE 0.9 % (FLUSH) 0.9 %
5-40 SYRINGE (ML) INJECTION EVERY 12 HOURS SCHEDULED
Status: DISCONTINUED | OUTPATIENT
Start: 2024-11-02 | End: 2024-11-02 | Stop reason: HOSPADM

## 2024-11-02 RX ORDER — LORAZEPAM 2 MG/ML
1 INJECTION INTRAMUSCULAR ONCE
Status: COMPLETED | OUTPATIENT
Start: 2024-11-02 | End: 2024-11-02

## 2024-11-02 RX ORDER — PROMETHAZINE HYDROCHLORIDE 25 MG/1
25 TABLET ORAL EVERY 6 HOURS PRN
Status: DISCONTINUED | OUTPATIENT
Start: 2024-11-02 | End: 2024-11-02

## 2024-11-02 RX ORDER — SODIUM CHLORIDE 0.9 % (FLUSH) 0.9 %
10 SYRINGE (ML) INJECTION PRN
Status: DISCONTINUED | OUTPATIENT
Start: 2024-11-02 | End: 2024-11-02 | Stop reason: HOSPADM

## 2024-11-02 RX ORDER — SODIUM CHLORIDE 9 MG/ML
INJECTION, SOLUTION INTRAVENOUS PRN
Status: DISCONTINUED | OUTPATIENT
Start: 2024-11-02 | End: 2024-11-02 | Stop reason: HOSPADM

## 2024-11-02 RX ORDER — HYDROXYZINE PAMOATE 50 MG/1
50 CAPSULE ORAL EVERY 8 HOURS PRN
Status: DISCONTINUED | OUTPATIENT
Start: 2024-11-02 | End: 2024-11-02

## 2024-11-02 RX ORDER — OXYCODONE HYDROCHLORIDE 5 MG/1
10 TABLET ORAL EVERY 4 HOURS PRN
Status: DISCONTINUED | OUTPATIENT
Start: 2024-11-02 | End: 2024-11-02 | Stop reason: HOSPADM

## 2024-11-02 RX ORDER — PROMETHAZINE HYDROCHLORIDE 25 MG/ML
6.25 INJECTION, SOLUTION INTRAMUSCULAR; INTRAVENOUS EVERY 6 HOURS PRN
Status: DISCONTINUED | OUTPATIENT
Start: 2024-11-02 | End: 2024-11-02 | Stop reason: HOSPADM

## 2024-11-02 RX ORDER — ONDANSETRON 2 MG/ML
4 INJECTION INTRAMUSCULAR; INTRAVENOUS EVERY 6 HOURS PRN
Status: DISCONTINUED | OUTPATIENT
Start: 2024-11-02 | End: 2024-11-02 | Stop reason: HOSPADM

## 2024-11-02 RX ORDER — TRAZODONE HYDROCHLORIDE 50 MG/1
50 TABLET, FILM COATED ORAL NIGHTLY PRN
Status: DISCONTINUED | OUTPATIENT
Start: 2024-11-02 | End: 2024-11-02 | Stop reason: HOSPADM

## 2024-11-02 RX ORDER — METHOCARBAMOL 750 MG/1
750 TABLET, FILM COATED ORAL EVERY 6 HOURS PRN
Status: DISCONTINUED | OUTPATIENT
Start: 2024-11-02 | End: 2024-11-02 | Stop reason: HOSPADM

## 2024-11-02 RX ORDER — POTASSIUM CHLORIDE 7.45 MG/ML
10 INJECTION INTRAVENOUS PRN
Status: DISCONTINUED | OUTPATIENT
Start: 2024-11-02 | End: 2024-11-02 | Stop reason: HOSPADM

## 2024-11-02 RX ORDER — IOPAMIDOL 755 MG/ML
75 INJECTION, SOLUTION INTRAVASCULAR
Status: COMPLETED | OUTPATIENT
Start: 2024-11-02 | End: 2024-11-02

## 2024-11-02 RX ORDER — POTASSIUM CHLORIDE 1500 MG/1
40 TABLET, EXTENDED RELEASE ORAL PRN
Status: DISCONTINUED | OUTPATIENT
Start: 2024-11-02 | End: 2024-11-02 | Stop reason: HOSPADM

## 2024-11-02 RX ORDER — CLONIDINE HYDROCHLORIDE 0.1 MG/1
0.1 TABLET ORAL EVERY 6 HOURS PRN
Status: DISCONTINUED | OUTPATIENT
Start: 2024-11-02 | End: 2024-11-02 | Stop reason: HOSPADM

## 2024-11-02 RX ORDER — ACETAMINOPHEN 500 MG
1000 TABLET ORAL EVERY 8 HOURS PRN
Status: DISCONTINUED | OUTPATIENT
Start: 2024-11-02 | End: 2024-11-02 | Stop reason: HOSPADM

## 2024-11-02 RX ORDER — POLYETHYLENE GLYCOL 3350 17 G/17G
17 POWDER, FOR SOLUTION ORAL DAILY PRN
Status: DISCONTINUED | OUTPATIENT
Start: 2024-11-02 | End: 2024-11-02 | Stop reason: HOSPADM

## 2024-11-02 RX ORDER — MAGNESIUM SULFATE 1 G/100ML
1000 INJECTION INTRAVENOUS PRN
Status: DISCONTINUED | OUTPATIENT
Start: 2024-11-02 | End: 2024-11-02 | Stop reason: HOSPADM

## 2024-11-02 RX ORDER — LORAZEPAM 2 MG/ML
2 INJECTION INTRAMUSCULAR ONCE
Status: DISCONTINUED | OUTPATIENT
Start: 2024-11-02 | End: 2024-11-02

## 2024-11-02 RX ORDER — HYDROXYZINE HYDROCHLORIDE 25 MG/1
50 TABLET, FILM COATED ORAL EVERY 8 HOURS PRN
Status: DISCONTINUED | OUTPATIENT
Start: 2024-11-02 | End: 2024-11-02 | Stop reason: HOSPADM

## 2024-11-02 RX ORDER — IOPAMIDOL 755 MG/ML
75 INJECTION, SOLUTION INTRAVASCULAR
Status: DISCONTINUED | OUTPATIENT
Start: 2024-11-02 | End: 2024-11-02

## 2024-11-02 RX ORDER — ACETAMINOPHEN 325 MG/1
650 TABLET ORAL EVERY 6 HOURS PRN
Status: DISCONTINUED | OUTPATIENT
Start: 2024-11-02 | End: 2024-11-02

## 2024-11-02 RX ORDER — ACETAMINOPHEN 650 MG/1
650 SUPPOSITORY RECTAL EVERY 6 HOURS PRN
Status: DISCONTINUED | OUTPATIENT
Start: 2024-11-02 | End: 2024-11-02

## 2024-11-02 RX ORDER — LOPERAMIDE HYDROCHLORIDE 2 MG/1
2 CAPSULE ORAL 4 TIMES DAILY PRN
Status: DISCONTINUED | OUTPATIENT
Start: 2024-11-02 | End: 2024-11-02 | Stop reason: HOSPADM

## 2024-11-02 RX ORDER — ONDANSETRON 4 MG/1
4 TABLET, ORALLY DISINTEGRATING ORAL EVERY 8 HOURS PRN
Status: DISCONTINUED | OUTPATIENT
Start: 2024-11-02 | End: 2024-11-02 | Stop reason: HOSPADM

## 2024-11-02 RX ADMIN — HYDROMORPHONE HYDROCHLORIDE 0.5 MG: 1 INJECTION, SOLUTION INTRAMUSCULAR; INTRAVENOUS; SUBCUTANEOUS at 01:11

## 2024-11-02 RX ADMIN — VANCOMYCIN HYDROCHLORIDE 1000 MG: 1 INJECTION, POWDER, LYOPHILIZED, FOR SOLUTION INTRAVENOUS at 02:29

## 2024-11-02 RX ADMIN — LORAZEPAM 1 MG: 2 INJECTION INTRAMUSCULAR; INTRAVENOUS at 01:12

## 2024-11-02 RX ADMIN — SODIUM CHLORIDE: 9 INJECTION, SOLUTION INTRAVENOUS at 04:32

## 2024-11-02 RX ADMIN — HYDROMORPHONE HYDROCHLORIDE 1 MG: 1 INJECTION, SOLUTION INTRAMUSCULAR; INTRAVENOUS; SUBCUTANEOUS at 01:57

## 2024-11-02 RX ADMIN — ONDANSETRON 4 MG: 4 TABLET, ORALLY DISINTEGRATING ORAL at 07:50

## 2024-11-02 RX ADMIN — IOPAMIDOL 75 ML: 755 INJECTION, SOLUTION INTRAVENOUS at 02:11

## 2024-11-02 RX ADMIN — HYDROMORPHONE HYDROCHLORIDE 0.5 MG: 1 INJECTION, SOLUTION INTRAMUSCULAR; INTRAVENOUS; SUBCUTANEOUS at 04:54

## 2024-11-02 RX ADMIN — OXYCODONE 10 MG: 5 TABLET ORAL at 08:42

## 2024-11-02 ASSESSMENT — PAIN DESCRIPTION - ORIENTATION: ORIENTATION: LEFT

## 2024-11-02 ASSESSMENT — PAIN DESCRIPTION - DESCRIPTORS: DESCRIPTORS: DISCOMFORT

## 2024-11-02 ASSESSMENT — PAIN SCALES - WONG BAKER: WONGBAKER_NUMERICALRESPONSE: NO HURT

## 2024-11-02 ASSESSMENT — PAIN SCALES - GENERAL
PAINLEVEL_OUTOF10: 8
PAINLEVEL_OUTOF10: 10

## 2024-11-02 ASSESSMENT — PAIN DESCRIPTION - LOCATION: LOCATION: ARM

## 2024-11-02 NOTE — PROGRESS NOTES
[facility]   Pharmacy Pharmacokinetic Monitoring Service - Vancomycin     Ash Bundy is a 43 y.o. male starting on vancomycin therapy for bone and joint infection. Pharmacy consulted by William Mcclain for monitoring and adjustment.    Target Concentration: Goal AUC/MONICA 400-600 mg*hr/L    Additional Antimicrobials: Zosyn    Pertinent Laboratory Values:   Wt Readings from Last 1 Encounters:   11/01/24 74.8 kg (165 lb)     Temp Readings from Last 1 Encounters:   11/02/24 97.9 °F (36.6 °C) (Oral)     Estimated Creatinine Clearance: 101 mL/min (based on SCr of 1 mg/dL).  Recent Labs     11/01/24  2346   CREATININE 1.0   BUN 16   WBC 6.3     Procalcitonin: 0.16    Pertinent Cultures:  Culture Date Source Results        MRSA Nasal Swab:     Plan:  Dosing recommendations based on Bayesian software  Start vancomycin 1250mg IVPB every 12 hours  Anticipated AUC of 530 and trough concentration of 13.7 at steady state  Renal labs as indicated   Vancomycin concentration ordered for  @    Pharmacy will continue to monitor patient and adjust therapy as indicated    Thank you for the consult,  Ishan Saravia RPH  11/2/2024 4:25 AM

## 2024-11-02 NOTE — ED PROVIDER NOTES
Faculty Sign-Out Attestation  Handoff taken on the following patient from prior Attending Physician: Terry  Note Started: 1:06 AM EDT    I was available and discussed any additional care issues that arose and coordinated the management plans with the resident(s) caring for the patient during my duty period. Any areas of disagreement with resident’s documentation of care or procedures are noted on the chart. I was personally present for the key portions of any/all procedures during my duty period. I have documented in the chart those procedures where I was not present during the key portions.    L upper extremity / post amputation cellulitis / necrosis,   Abx, needing admit    Marc Hardy DO  Attending Physician       Marc Hardy DO  11/02/24 0107
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
## 2024-11-02 NOTE — H&P
erosion, drainage from the left upper extremity he has been trying cloth around the wound.  He notes that he uses fentanyl and injects about 3 g a day into the left upper extremity wound.  In the emergency department labs are notable for WBC 6.3, CRP 51.8, lactic 2.4, UDS with fentanyl and opiates, hemoglobin 6.9.  He was started on antibiotics admitted to medicine for further management.    Past Medical History:     History reviewed. No pertinent past medical history.     Past Surgical History:     Past Surgical History:   Procedure Laterality Date    APPENDECTOMY      HAND SURGERY Right         Medications Prior to Admission:     Prior to Admission medications    Medication Sig Start Date End Date Taking? Authorizing Provider   Neomycin-Polymyxin-Dexameth 0.1 % OINT Apply 0.5 inches to eye every 4 hours 10/5/20   Ana Cristina Jara MD        Allergies:     Patient has no known allergies.    Social History:     Tobacco:    reports that he has been smoking. He has never used smokeless tobacco.  Alcohol:      reports no history of alcohol use.  Drug Use:  reports current drug use. Drug: IV.    Family History:     History reviewed. No pertinent family history.    Review of Systems:     Positive and Negative as described in HPI.    Physical Exam:   BP (!) 167/63   Pulse 65   Temp 98 °F (36.7 °C)   Resp 18   Ht 1.829 m (6')   Wt 74.8 kg (165 lb)   SpO2 95%   BMI 22.38 kg/m²   Temp (24hrs), Av.2 °F (36.8 °C), Min:97.3 °F (36.3 °C), Max:100.5 °F (38.1 °C)    Recent Labs     24  0227   POCGLU 121*     No intake or output data in the 24 hours ending 24 0950    General: Alert and oriented, no acute distress  Neurologic: Awake, alert, and oriented X3, CN grossly intact  Eye: PERRL, EOMI, normal conjunctiva  HENT: Normocephalic, no scleral icterus  Neck: Supple, no JVD  Lungs: Clear to auscultation, non-labored respiration  Heart: Normal rate, regular rhythm, no murmur, gallop or edema  Abdomen:  Crossmatch Result COMPATIBLE    POC Glucose Fingerstick    Collection Time: 11/02/24  2:27 AM   Result Value Ref Range    POC Glucose 121 (H) 75 - 110 mg/dL   Lactate, Sepsis    Collection Time: 11/02/24  2:39 AM   Result Value Ref Range    Lactic Acid, Sepsis, Whole Blood 1.3 0.5 - 1.9 mmol/L   Wound Gram stain    Collection Time: 11/02/24  7:32 AM    Specimen: Wound; No Site Given   Result Value Ref Range    Specimen Description .WOUND     Special Requests Site: No Site Given     Direct Exam DUPLICATE ORDER        Imaging/Diagnostics:  CT HUMERUS LEFT W CONTRAST    Result Date: 11/2/2024  1.  Chronic appearing osseous erosions, sclerosis, and periostitis of the distal humerus and residual ulna compatible with chronic osteomyelitis. 2.  Diffuse skin irregularity over the shoulder and humerus.  Skin integrity would be better appreciated by direct visualization. 3.  Prominent axillary lymph nodes.  Evaluation of abscess is limited in the absence of intravenous contrast.     XR SHOULDER LEFT (MIN 2 VIEWS)    Result Date: 11/2/2024  No acute osseous abnormality of the left shoulder.     XR HUMERUS LEFT (MIN 2 VIEWS)    Result Date: 11/2/2024  Osseous erosions of the distal humerus and residual ulna concerning for osteomyelitis.     XR CHEST PORTABLE    Result Date: 11/2/2024  No acute cardiopulmonary findings.       Assessment :      Hospital Problems             Last Modified POA    * (Principal) Osteomyelitis of left upper extremity 11/2/2024 Yes       Plan:     Left residual limb osteomyelitis  -Orthopedic surgery consulted, no plans for surgical intervention  -Plan was to continue antibiotics, consult ID, however patient left AMA shortly after evaluation    Microcytic anemia  -S/p 1 unit PRBC  -Unable to assess etiology as patient left AMA    Opiate use disorder    Patient left AMA shortly after initial evaluation prohibiting further management    Consultations:   IP CONSULT TO ORTHOPEDIC SURGERY  IP CONSULT TO

## 2024-11-02 NOTE — ED NOTES
PT back from CT, pt resting on stretcher, RR even and unlabored. Additional warm blankets provided. Call light within reach.

## 2024-11-02 NOTE — CONSULTS
[facility] -   Infectious diseases evaluation  admission date 11/1/2024    reason for consultation:   Osteomyelitis of the left residual limb     Impression :   Current:  Osteomyelitis of the left residual limb     Other:    Discussion / summary of stay / plan of care/ Recommendations:     HENCE:   Xray on 11/1 showed Osseous erosions of the distal humerus and residual ulna concerning for osteomyelitis.No envolvment of the left shoulder.   CRP, procal and lactate are elevated   Pt started on Zosyn 3375mg on 11/1  Vanco 1000mg started today 11/2    Infection Control Recommendations   [place] Precautions        Antimicrobial Stewardship Recommendations   Simplification of therapy  Targeted therapy    History of Present Illness:   Initial history:  Ash Bundy is a 43 y.o.-year-old male who was brought into the ED for left hand wound and drug withdrawal. On admission, radiographs pt was shown to have osteomyelitis of the left residual limb bone. WBC 6, CRP 51, Procal 0.16, Hgb 6.9. BC showed no growth. Pt was given blood transfusion (Hgb 6.9).    Today       Interval changes  11/2/2024   Patient Vitals for the past 8 hrs:   BP Temp Temp src Pulse Resp SpO2 Height   11/02/24 0806 (!) 167/63 98 °F (36.7 °C) -- -- 18 95 % --   11/02/24 0524 -- -- -- -- 13 -- --   11/02/24 0425 -- 97.7 °F (36.5 °C) Tympanic 65 14 100 % --   11/02/24 0345 -- 97.9 °F (36.6 °C) Oral -- -- -- --   11/02/24 0338 -- -- -- 66 -- 100 % --   11/02/24 0335 (!) 174/75 -- -- 64 -- 100 % --   11/02/24 0330 (!) 150/77 -- -- 73 -- 98 % --   11/02/24 0325 (!) 176/81 -- -- 72 11 100 % --   11/02/24 0320 (!) 169/84 -- -- 64 15 100 % --   11/02/24 0315 (!) 177/79 -- -- 63 10 100 % --   11/02/24 0310 (!) 143/77 -- -- 63 16 98 % --   11/02/24 0305 (!) 188/73 98.1 °F (36.7 °C) Oral 66 14 100 % --   11/02/24 0300 (!) 171/75 98.4 °F (36.9 °C) Oral 65 12 100 % --   11/02/24 0255 (!) 153/64 97.5 °F (36.4 °C) 
VAT consulted for PIV.  Patient refused at this time.  Will attempt at a later time.  
Authorizing Provider   Neomycin-Polymyxin-Dexameth 0.1 % OINT Apply 0.5 inches to eye every 4 hours 10/5/20   Ana Cristina Jara MD     Allergies:    Patient has no known allergies.  Social History:   Social History     Socioeconomic History    Marital status: Single     Spouse name: None    Number of children: None    Years of education: None    Highest education level: None   Tobacco Use    Smoking status: Every Day    Smokeless tobacco: Never   Substance and Sexual Activity    Alcohol use: No    Drug use: Yes     Types: IV     Comment: Heroine, \"anything I can get my hands on\"     Social Determinants of Health     Food Insecurity: No Food Insecurity (4/23/2023)    Received from [facility]    Hunger Screening     Within the past 12 months we worried whether our food would run out before we got money to buy more.: Never True     Within the past 12 months the food we bought just didn't last and we didn't have money to get more.: Never True     Family History:  History reviewed. No pertinent family history.    ROS:   Constitutional: Positive for fever and chills.   Respiratory: Negative for cough.    Cardiovascular: Negative for chest pain.   Musculoskeletal: Positive for left residual limb pain.   Skin: Negative for itching and rash.   Neurological: Negative for numbness, tingling, weakness.     PE:  Blood pressure (!) 131/57, pulse 68, temperature 97.3 °F (36.3 °C), temperature source Oral, resp. rate 16, weight 74.8 kg (165 lb), SpO2 100%.    Gen: Alert and oriented, NAD, cooperative.     Head: Normocephalic, atraumatic.    Cardiovascular: Regular rate on monitor.    Respiratory: Chest symmetric, no accessory muscle use.      LUE: Previous left below the elbow amputation.  Extensive track marks at the distal aspect of the residual limb appreciated.  Edematous, tense, nonblanching tissue throughout the left residual limb. Macerated skin from the distalmost aspect of the residual limb to

## 2024-11-02 NOTE — ED TRIAGE NOTES
06/18/17 1249   Final Note   Assessment Type Final Discharge Note   Discharge Disposition Home   Discharge planning education complete? Yes   What phone number can be called within the next 1-3 days to see how you are doing after discharge? ([phone] )   Hospital Follow Up  Appt(s) scheduled? No  (unable to schedule)   Discharge plans and expectations educations in teach back method with documentation complete? Yes   Offered [facility] -- Bedside Delivery? n/a   [facility] n/a   Referral to Outpatient Case Management complete? n/a   Referral to / orders for Home Health Complete? n/a   30 day supply of medicines given at discharge, if documented non-compliance / non-adherence? n/a   Any social issues identified prior to discharge? No   Did you assess the readiness or willingness of the family or caregiver to support self management of care? Yes   Right Care Referral Info   Post Acute Recommendation No Care      Pt presents to the ED ambulatory through triage by [employer] for medical clearance after a traffic stop d/t substance withdrawal and wound check. PT reports use of heroin and \"anything I can get my hands on.\" PT has a hx of LT distal extremity amputation, reports amputation \"over a year ago\" and states he does not seek wound care. PT states he has been injecting IV drugs into his LT forearm. PT arrived with ace wrap to BRENTE. Necrosis noted to lower half of stump, as well as blistering, edema, and redness throughout. Track marks noted to JONNA, pt reports extensive IV drug injection to this extremity as well. Pt tachycardic and afebrile upon arrival. PT resting on stretcher, A&OX4. Call light within reach. PT is a call upon release per [employer], [employer] ntfd.

## 2024-11-02 NOTE — ED NOTES
Pt to floor on portable monitor by KADEN Magdaleno. Blood transfusion ongoing. Belongings secured to stretcher.

## 2024-11-02 NOTE — CONSENT
Informed Consent for Blood Component Transfusion Note    I have discussed with the patient the rationale for blood component transfusion; its benefits in treating or preventing fatigue, organ damage, or death; and its risk which includes mild transfusion reactions, rare risk of blood borne infection, or more serious but rare reactions. I have discussed the alternatives to transfusion, including the risk and consequences of not receiving transfusion. The patient had an opportunity to ask questions and had agreed to proceed with transfusion of blood components.    Electronically signed by Juan Resendiz DO on 11/2/24 at 1:02 AM EDT

## 2024-11-02 NOTE — DISCHARGE INSTRUCTIONS
Orthopaedic Instructions:  -Weight bearing status: Weight bearing as tolerated with the left upper extremity residual limb  -Always look for signs of compartment syndrome: pain out of proportion to the injury, pain not controlled with pain medication, numbness in digits, changing of color of digits (paleness). If these signs occur return to ED immediately for reassessment.  -Ice (20 minutes on and off 1 hour) and elevate above the level of the heart to reduce swelling and throbbing pain.  -Call the office or come to Emergency Room if signs of infection appear (hot, swollen, red, draining pus, fever).  -Take medications as prescribed.  -Wean off narcotics (percocet/norco) as soon as possible. Do not take tylenol if still taking narcotics.  -Follow up with Dr. Jovel in his office on  11/20/2024 at 2:45 PM . Call [phone] to schedule/confirm or with any questions/concerns.

## 2024-11-02 NOTE — PROGRESS NOTES
Patient left AMA. Patient came up to the nurses station and said he's \"going home\". Patient would not sign AMA paperwork, writer and witnessing nurse signed.  Dr. Hull notified of AMA.

## 2024-11-02 NOTE — ED NOTES
PT resting on stretcher, alert, RR even and unlabored. PT agreeable to CT upon administration of pain medications. CT ntfd that pt is ready.

## 2024-11-02 NOTE — ED PROVIDER NOTES
[facility]     Emergency Department     Faculty Attestation  11:38 PM EDT      I performed a history and physical examination of the patient and discussed management with the resident. I have reviewed and agree with the resident’s findings including all diagnostic interpretations, and treatment plans as written. Any areas of disagreement are noted on the chart. I was personally present for the key portions of any procedures. I have documented in the chart those procedures where I was not present during the key portions. I have reviewed the emergency nurses triage note. I agree with the chief complaint, past medical history, past surgical history, allergies, medications, social and family history as documented unless otherwise noted below. Documentation of the HPI, Physical Exam and Medical Decision Making performed by scribes is based on my personal performance of the HPI, PE and MDM. For Physician Assistant/ Nurse Practitioner cases/documentation I have personally evaluated this patient and have completed at least one if not all key elements of the E/M (history, physical exam, and MDM). Additional findings are as noted.    H/I IVDU, still active user, brought in by police after being arrest due to appearance of his left arm, previous  amputation below the elbow in 2023, patient with edematous, necrotic wound to to this distal aspect the rest of the skin is broken down, and blistering proximally    He is febrile, tachycardic, IVDU   Septic work up, xray, iv abx, ortho consult, admit  Lactic acid, blood cultures      Aidee Stewart D.O, M.P.H  Attending Emergency Medicine Physician         Aidee Stewart, DO  11/01/24 0838

## 2024-11-02 NOTE — ED NOTES
The following labs were labeled with appropriate pt sticker and tubed to lab:     [x] Blue x2     [x] Lavender   [] on ice  [x] Green/yellow  [x] Green/black [] on ice  [] Concepcion  [] on ice  [x] Yellow  [] Red  [] Pink  [] Type/ Screen  [] ABG  [] VBG    [] COVID-19 swab    [] Rapid  [] PCR  [] Flu swab  [] Peds Viral Panel     [] Urine Sample  [] Fecal Sample  [] Pelvic Cultures  [x] Blood Cultures  [x] X 2  [] STREP Cultures  [] Wound Cultures

## 2024-11-02 NOTE — ED NOTES
KADEN Magdaleno on 2C ntfd that pt is a call upon release. Dispatch ntfd as well with pt's new room assignment.

## 2024-11-02 NOTE — ED NOTES
ED to inpatient nurses report      Chief Complaint:  Chief Complaint   Patient presents with    Wound Check    Addiction Problem     Present to ED from: triage in [employer] custody    MOA:     LOC: alert and orientated to name, place, date  Mobility: Independent  Oxygen Baseline: room air    Current needs required: none   Pending ED orders: none  Present condition: stable    Why did the patient come to the ED?   Pt presents to the ED ambulatory through triage by [employer] for medical clearance after a traffic stop d/t substance withdrawal and wound check. PT reports use of heroin and \"anything I can get my hands on.\" PT has a hx of LT distal extremity amputation, reports amputation \"over a year ago\" and states he does not seek wound care. PT states he has been injecting IV drugs into his LT forearm. PT arrived with ace wrap to BRENTE. Necrosis noted to lower half of stump, as well as blistering, edema, and redness throughout. Track marks noted to RUE, pt reports extensive IV drug injection to this extremity as well. Pt tachycardic and afebrile upon arrival. PT resting on stretcher, A&OX4. Call light within reach. PT is a call upon release per [employer], [employer] ntfd.     What is the plan?   CT being obtained at this time, IV ABX, ortho consulted     Any procedures or intervention occur?   Labs, XR, CT, IV ABX,     Any safety concerns??    Mental Status:  Level of Consciousness: Alert (0)    Psych Assessment:   Psychosocial  Psychosocial (WDL): Within Defined Limits  Vital signs   Vitals:    11/02/24 0145 11/02/24 0157 11/02/24 0200 11/02/24 0202   BP: (!) 148/85  (!) 157/76    Pulse: 67  66    Resp:  19 11    Temp:       TempSrc:       SpO2: 100%  100%    Weight:       Height:    1.829 m (6')        Vitals:  Patient Vitals for the past 24 hrs:   BP Temp Temp src Pulse Resp SpO2 Height Weight   11/02/24 0202 -- -- -- -- -- -- 1.829 m (6') --   11/02/24 0200 (!) 157/76 -- -- 66 11 100 % -- --   11/02/24 0157 -- -- -- -- 19 -- -- --   11/02/24  Parish, Marta Perez, Bertrand Keith, DO  Ignacio Dominguez, Kenny Gonzales, DO Mcclain, William KIRK,     Treatment:  ED Course as of 11/02/24 0211 Fri Nov 01, 2024   5735 Patient is a 43-year-old male who presents to the ED via [employer] for concerns of withdrawal.  Patient reportedly ran through a stop sign and was subsequently arrested.  Patient has an extensive polysubstance abuse.  He reports using between 2 to 3 g of heroin per day that is often times mixed with fentanyl..  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  Patient states he has had the amputation for approximately 1 year and has been using the stump to inject heroin.  Patient is endorsing withdrawal pain as well as generalized discomfort with predominance of it in the left upper extremity near the stump site.  Patient has not been performing any wound care.  He states he last wrapped it in a T-shirt and then an Ace bandage.  Does endorse fevers and chills but no acute chest pain or shortness of breath.  Denies any abdominal pain.    Patient states that he will need ultrasound IV due to his extensive IV drug use    Physical Exam: GCS 15, AO x 4, toxic appearing, mild acute distress.  Febrile 100.5 degrees Fahrenheit.  Tachycardic.  Heart has a regular rhythm.  Lungs are clear but distant sound without any rhonchi crackles or wheezing.  Abdomen: S, ND, NTTP.  Right upper extremity shows extensive track marks.  Left upper extremity: Amputation at the elbow with necrotic tissue on the lateral aspect with significant swelling over the distal aspect with erythematous nonblanching skin changes extending up the proximal portion to the shoulder with areas of palpable crepitus.     Concern for: Necrotizing fasciitis, cellulitis, abscess, osteomyelitis, sepsis, electrolyte derangement, polysubstance abuse, heroin withdrawal    Plan/Impression: Will perform sepsis workup.  Will obtain x-ray

## 2024-11-03 LAB
ABO/RH: NORMAL
ANTIBODY SCREEN: NEGATIVE
ARM BAND NUMBER: NORMAL
BLOOD BANK BLOOD PRODUCT EXPIRATION DATE: NORMAL
BLOOD BANK DISPENSE STATUS: NORMAL
BLOOD BANK ISBT PRODUCT BLOOD TYPE: 600
BLOOD BANK PRODUCT CODE: NORMAL
BLOOD BANK SAMPLE EXPIRATION: NORMAL
BLOOD BANK UNIT TYPE AND RH: NORMAL
BPU ID: NORMAL
COMPONENT: NORMAL
CROSSMATCH RESULT: NORMAL
MICROORGANISM SPEC CULT: ABNORMAL
MICROORGANISM SPEC CULT: ABNORMAL
MICROORGANISM SPEC CULT: NORMAL
MICROORGANISM SPEC CULT: NORMAL
MICROORGANISM/AGENT SPEC: ABNORMAL
SERVICE CMNT-IMP: ABNORMAL
SERVICE CMNT-IMP: NORMAL
SERVICE CMNT-IMP: NORMAL
SPECIMEN DESCRIPTION: ABNORMAL
SPECIMEN DESCRIPTION: NORMAL
SPECIMEN DESCRIPTION: NORMAL
TRANSFUSION STATUS: NORMAL
UNIT DIVISION: 0
UNIT ISSUE DATE/TIME: NORMAL

## 2024-11-05 LAB
MICROORGANISM SPEC CULT: ABNORMAL
MICROORGANISM/AGENT SPEC: ABNORMAL
SERVICE CMNT-IMP: ABNORMAL
SPECIMEN DESCRIPTION: ABNORMAL

## 2024-11-06 LAB
MICROORGANISM SPEC CULT: ABNORMAL
MICROORGANISM SPEC CULT: NORMAL
SERVICE CMNT-IMP: ABNORMAL
SERVICE CMNT-IMP: NORMAL
SPECIMEN DESCRIPTION: ABNORMAL
SPECIMEN DESCRIPTION: NORMAL